# Patient Record
Sex: FEMALE | Race: WHITE | NOT HISPANIC OR LATINO | Employment: UNEMPLOYED | URBAN - METROPOLITAN AREA
[De-identification: names, ages, dates, MRNs, and addresses within clinical notes are randomized per-mention and may not be internally consistent; named-entity substitution may affect disease eponyms.]

---
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
## 2025-01-13 NOTE — TELEPHONE ENCOUNTER
REASON FOR VISIT: i have a diagnosed hip impingement and dysplasia would like to talk about my treatment options after a surgery i had in 2021    DATE OF APPT: 1/21/2025   NOTES (FOR ALL VISITS) STATUS DETAILS   OFFICE NOTE from referring provider N/A Self   OFFICE NOTE from other specialist [facility]  Arianna Agosto, PT  5/11/2021 - 6/23/2021   OPERATIVE REPORT Internal [facility]  Rios Schultz MD   Right hip arthroscopy, labral repair debridement 3/29/2021   EMG N/A    MEDICATION LIST N/A    IMAGING  (FOR ALL VISITS)     XR Internal [facility]  XR Pelvis/hip bilateral 1/05/2021   MRI (HEAD, NECK, SPINE) [facility]  MR Hip arthrogram right 2/09/2021   CT (HEAD, NECK, SPINE) N/A

## 2025-01-21 ENCOUNTER — OFFICE VISIT (OUTPATIENT)
Dept: ORTHOPEDICS | Facility: CLINIC | Age: 20
End: 2025-01-21
Payer: COMMERCIAL

## 2025-01-21 ENCOUNTER — PRE VISIT (OUTPATIENT)
Dept: ORTHOPEDICS | Facility: CLINIC | Age: 20
End: 2025-01-21

## 2025-01-21 ENCOUNTER — ANCILLARY PROCEDURE (OUTPATIENT)
Dept: GENERAL RADIOLOGY | Facility: CLINIC | Age: 20
End: 2025-01-21
Attending: FAMILY MEDICINE
Payer: COMMERCIAL

## 2025-01-21 DIAGNOSIS — G89.29 CHRONIC HIP PAIN, BILATERAL: Primary | ICD-10-CM

## 2025-01-21 DIAGNOSIS — M25.552 CHRONIC HIP PAIN, BILATERAL: Primary | ICD-10-CM

## 2025-01-21 DIAGNOSIS — M25.551 RIGHT HIP PAIN: ICD-10-CM

## 2025-01-21 DIAGNOSIS — M25.551 RIGHT HIP PAIN: Primary | ICD-10-CM

## 2025-01-21 DIAGNOSIS — M25.551 CHRONIC HIP PAIN, BILATERAL: Primary | ICD-10-CM

## 2025-01-21 PROCEDURE — 73522 X-RAY EXAM HIPS BI 3-4 VIEWS: CPT | Performed by: RADIOLOGY

## 2025-01-21 PROCEDURE — 99204 OFFICE O/P NEW MOD 45 MIN: CPT | Performed by: FAMILY MEDICINE

## 2025-01-21 ASSESSMENT — PAIN SCALES - GENERAL: PAINLEVEL_OUTOF10: MODERATE PAIN (6)

## 2025-01-21 NOTE — PROGRESS NOTES
HISTORY OF PRESENT ILLNESS  Ms. Barros is a 19 year old female who presents to clinic today with hip pain.  Namrata has a history of developmental hip dysplasia along with a hypertrophic labrum in her right hip, she underwent a arthroscopic labrum repair in 2021 with Dr. Schultz.  This surgery unfortunately did not help whatsoever with her pain.  She continues to feel significant pain, she points to the anterior portion of her hip at the groin.  Right is worse than left.        Additional history: as documented    MEDICAL HISTORY  Patient Active Problem List   Diagnosis    Right hip pain       Current Outpatient Medications   Medication Sig Dispense Refill    acetaminophen (TYLENOL) 325 MG tablet Take 2 tablets (650 mg) by mouth every 4 hours (Patient not taking: Reported on 4/14/2021) 80 tablet 0    cetirizine (ZYRTEC) 5 MG/5ML syrup Take  by mouth daily. 3 ml daily (Patient not taking: Reported on 7/25/2019) 118 mL 1    diclofenac (VOLTAREN) 75 MG EC tablet Take 1 tablet (75 mg) by mouth 2 times daily as needed for moderate pain (Patient not taking: Reported on 5/11/2021) 30 tablet 1    oxyCODONE (ROXICODONE) 5 MG tablet Take 1-2 tablets (5-10 mg) by mouth every 4 hours as needed for moderate to severe pain (Patient not taking: Reported on 4/14/2021) 12 tablet 0    Sulfacetamide Sodium-Sulfur (SULFACLEANSE 8/4) 8-4 % SUSP 1 application to affected area      tiZANidine (ZANAFLEX) 2 MG tablet Take 1 tablet (2 mg) by mouth nightly as needed for muscle spasms (Patient not taking: Reported on 5/11/2021) 30 tablet 1    tretinoin (RETIN-A) 0.1 % external cream 1 application to affected area in the evening to face         No Known Allergies    No family history on file.    Additional medical/Social/Surgical histories reviewed in EPIC and updated as appropriate.        PHYSICAL EXAM  General  - normal appearance, in no obvious distress  Musculoskeletal - right and left hip  - stance: normal gait without limp, no  obvious leg length discrepancy   - inspection: no swelling or effusion, normal bone and joint alignment, no obvious deformity  - palpation: no lateral or anterior hip tenderness  - ROM: pain with flexion and internal rotation, normal extension, external rotation  - strength: 5/5 in all planes  - special tests:  (-) YASMIN  (+) FADIR  Neuro  - no sensory or motor deficit, grossly normal coordination, normal muscle tone               ASSESSMENT & PLAN  Ms. Barros is a 19 year old female who presents to clinic today with bilateral hip pain.    I ordered and independently reviewed an xray of her right and left hip, these show no obvious acute or chronic issues.    Given the chronicity and her level of ongoing pain I am ordering MRI imaging, this will be an arthrogram for her right hip and her left hip.  I will get in touch with her with the results.    It was a pleasure seeing Namrata today.    Christopher Hernandez DO, [facility]  Primary Care Sports Medicine      This note was constructed using Dragon dictation software, please excuse any minor errors in spelling, grammar, or syntax.

## 2025-01-21 NOTE — NURSING NOTE
Chief Complaint   Patient presents with    Right Hip - Pain, New Patient     Right hip pain       There were no vitals filed for this visit.    There is no height or weight on file to calculate BMI.      ELLE Mcnulty NREMT

## 2025-01-21 NOTE — LETTER
1/21/2025      Namrata Barros  [address]  [facility] [address]      Dear Colleague,    Thank you for referring your patient, Namrata Barros, to the [facility] [place]. Please see a copy of my visit note below.        HISTORY OF PRESENT ILLNESS  Ms. Barros is a 19 year old female who presents to clinic today with hip pain.  Namrata has a history of developmental hip dysplasia along with a hypertrophic labrum in her right hip, she underwent a arthroscopic labrum repair in 2021 with Dr. Schultz.  This surgery unfortunately did not help whatsoever with her pain.  She continues to feel significant pain, she points to the anterior portion of her hip at the groin.  Right is worse than left.        Additional history: as documented    MEDICAL HISTORY  Patient Active Problem List   Diagnosis     Right hip pain       Current Outpatient Medications   Medication Sig Dispense Refill     acetaminophen (TYLENOL) 325 MG tablet Take 2 tablets (650 mg) by mouth every 4 hours (Patient not taking: Reported on 4/14/2021) 80 tablet 0     cetirizine (ZYRTEC) 5 MG/5ML syrup Take  by mouth daily. 3 ml daily (Patient not taking: Reported on 7/25/2019) 118 mL 1     diclofenac (VOLTAREN) 75 MG EC tablet Take 1 tablet (75 mg) by mouth 2 times daily as needed for moderate pain (Patient not taking: Reported on 5/11/2021) 30 tablet 1     oxyCODONE (ROXICODONE) 5 MG tablet Take 1-2 tablets (5-10 mg) by mouth every 4 hours as needed for moderate to severe pain (Patient not taking: Reported on 4/14/2021) 12 tablet 0     Sulfacetamide Sodium-Sulfur (SULFACLEANSE 8/4) 8-4 % SUSP 1 application to affected area       tiZANidine (ZANAFLEX) 2 MG tablet Take 1 tablet (2 mg) by mouth nightly as needed for muscle spasms (Patient not taking: Reported on 5/11/2021) 30 tablet 1     tretinoin (RETIN-A) 0.1 % external cream 1 application to affected area in the evening to face         No Known Allergies    No  family history on file.    Additional medical/Social/Surgical histories reviewed in EPIC and updated as appropriate.        PHYSICAL EXAM  General  - normal appearance, in no obvious distress  Musculoskeletal - right and left hip  - stance: normal gait without limp, no obvious leg length discrepancy   - inspection: no swelling or effusion, normal bone and joint alignment, no obvious deformity  - palpation: no lateral or anterior hip tenderness  - ROM: pain with flexion and internal rotation, normal extension, external rotation  - strength: 5/5 in all planes  - special tests:  (-) YASMIN  (+) FADIR  Neuro  - no sensory or motor deficit, grossly normal coordination, normal muscle tone               ASSESSMENT & PLAN  Ms. Barros is a 19 year old female who presents to clinic today with bilateral hip pain.    I ordered and independently reviewed an xray of her right and left hip, these show no obvious acute or chronic issues.    Given the chronicity and her level of ongoing pain I am ordering MRI imaging, this will be an arthrogram for her right hip and her left hip.  I will get in touch with her with the results.    It was a pleasure seeing Namrata today.    Christopher Hernandez DO, [facility]  Primary Care [facility] Medicine      This note was constructed using Dragon dictation software, please excuse any minor errors in spelling, grammar, or syntax.      Again, thank you for allowing me to participate in the care of your patient.        Sincerely,    Christopher Hernandez DO    Electronically signed

## 2025-02-05 ENCOUNTER — TRANSFERRED RECORDS (OUTPATIENT)
Dept: HEALTH INFORMATION MANAGEMENT | Facility: CLINIC | Age: 20
End: 2025-02-05
Payer: COMMERCIAL

## 2025-02-08 ENCOUNTER — HEALTH MAINTENANCE LETTER (OUTPATIENT)
Age: 20
End: 2025-02-08

## 2025-02-24 ENCOUNTER — OFFICE VISIT (OUTPATIENT)
Dept: ORTHOPEDICS | Facility: CLINIC | Age: 20
End: 2025-02-24
Payer: COMMERCIAL

## 2025-02-24 DIAGNOSIS — S73.191D TEAR OF RIGHT ACETABULAR LABRUM, SUBSEQUENT ENCOUNTER: Primary | ICD-10-CM

## 2025-02-24 PROCEDURE — 99214 OFFICE O/P EST MOD 30 MIN: CPT | Performed by: FAMILY MEDICINE

## 2025-02-24 ASSESSMENT — PAIN SCALES - GENERAL: PAINLEVEL_OUTOF10: SEVERE PAIN (7)

## 2025-02-24 NOTE — PROGRESS NOTES
HISTORY OF PRESENT ILLNESS  Ms. Barros is a pleasant 19 year old female following up with bilateral hip pain.  Namrata is here to review her MRI.  Unfortunately she continues to experience bilateral hip pain, no significant change in her pain.  She may feel worse on certain days.     PHYSICAL EXAM  General  - normal appearance, in no obvious distress  Musculoskeletal - right and left hip  - stance: normal gait without limp, no obvious leg length discrepancy   - inspection: no swelling or effusion, normal bone and joint alignment, no obvious deformity  - palpation: no lateral or anterior hip tenderness  - ROM: pain with flexion and internal rotation, normal extension, external rotation  - strength: 5/5 in all planes  - special tests:  (-) YASMIN  (+) FADIR  Neuro  - no sensory or motor deficit, grossly normal coordination, normal muscle tone    ASSESSMENT & PLAN  Ms. Barros is a 19 year old female following up with bilateral hip pain.    I reviewed her MR results in the room with her, this reads:  1.  Surgical changes status post right hip anterior superior labral repair.  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  No right hip chondral loss or subchondral cystic change/subchondral edema-like signal.  2.  No fracture, osseous stress injury, or tendinous pathology of the right hip.    We discussed these results and their implications.  It does seem that her labrum tear is the source of her symptoms.  We discussed the utility of a diagnostic and therapeutic corticosteroid injection, we also discussed the role of further physical therapy and a surgical consult.  Namrata is significantly affected by her symptoms, she is interested in discussing this with a surgeon.  I am referring her to our partners to have this discussion.    It was a pleasure seeing Namrata.        Christopher Hernandez DO, [facility]      This note was constructed using  Dragon dictation software, please excuse any minor errors in spelling, grammar, or syntax.

## 2025-02-24 NOTE — LETTER
2/24/2025      Namrata Barros  [address] [place]  [facility] [address]      Dear Colleague,    Thank you for referring your patient, Namrata Barros, to the [facility] [place]. Please see a copy of my visit note below.    HISTORY OF PRESENT ILLNESS  Ms. Barros is a pleasant 19 year old female following up with bilateral hip pain.  Namrata is here to review her MRI.  Unfortunately she continues to experience bilateral hip pain, no significant change in her pain.  She may feel worse on certain days.     PHYSICAL EXAM  General  - normal appearance, in no obvious distress  Musculoskeletal - right and left hip  - stance: normal gait without limp, no obvious leg length discrepancy   - inspection: no swelling or effusion, normal bone and joint alignment, no obvious deformity  - palpation: no lateral or anterior hip tenderness  - ROM: pain with flexion and internal rotation, normal extension, external rotation  - strength: 5/5 in all planes  - special tests:  (-) YASMIN  (+) FADIR  Neuro  - no sensory or motor deficit, grossly normal coordination, normal muscle tone    ASSESSMENT & PLAN  Ms. Barros is a 19 year old female following up with bilateral hip pain.    I reviewed her MR results in the room with her, this reads:  1.  Surgical changes status post right hip anterior superior labral repair.  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  No right hip chondral loss or subchondral cystic change/subchondral edema-like signal.  2.  No fracture, osseous stress injury, or tendinous pathology of the right hip.    We discussed these results and their implications.  It does seem that her labrum tear is the source of her symptoms.  We discussed the utility of a diagnostic and therapeutic corticosteroid injection, we also discussed the role of further physical therapy and a surgical  consult.  Namrata is significantly affected by her symptoms, she is interested in discussing this with a surgeon.  I am referring her to our partners to have this discussion.    It was a pleasure seeing Namrata.        Christopher Hernandez DO, CAQSM      This note was constructed using Dragon dictation software, please excuse any minor errors in spelling, grammar, or syntax.        Again, thank you for allowing me to participate in the care of your patient.        Sincerely,    Christopher Hernandez DO    Electronically signed

## 2025-02-24 NOTE — NURSING NOTE
Chief Complaint   Patient presents with    Right Hip - Pain, Follow Up     MRI follow up       There were no vitals filed for this visit.    There is no height or weight on file to calculate BMI.      ELLE Mcnulty NREMT

## 2025-05-14 SDOH — HEALTH STABILITY: PHYSICAL HEALTH: ON AVERAGE, HOW MANY MINUTES DO YOU ENGAGE IN EXERCISE AT THIS LEVEL?: 40 MIN

## 2025-05-14 SDOH — HEALTH STABILITY: PHYSICAL HEALTH: ON AVERAGE, HOW MANY DAYS PER WEEK DO YOU ENGAGE IN MODERATE TO STRENUOUS EXERCISE (LIKE A BRISK WALK)?: 1 DAY

## 2025-05-15 ENCOUNTER — OFFICE VISIT (OUTPATIENT)
Dept: ORTHOPEDICS | Facility: CLINIC | Age: 20
End: 2025-05-15
Payer: COMMERCIAL

## 2025-05-15 DIAGNOSIS — M25.852 FEMOROACETABULAR IMPINGEMENT OF BOTH HIPS: Primary | ICD-10-CM

## 2025-05-15 DIAGNOSIS — M25.851 FEMOROACETABULAR IMPINGEMENT OF BOTH HIPS: Primary | ICD-10-CM

## 2025-05-15 RX ORDER — CLONAZEPAM 0.5 MG/1
0.5 TABLET ORAL 2 TIMES DAILY
COMMUNITY

## 2025-05-15 RX ORDER — TRIAMCINOLONE ACETONIDE 40 MG/ML
40 INJECTION, SUSPENSION INTRA-ARTICULAR; INTRAMUSCULAR
Status: COMPLETED | OUTPATIENT
Start: 2025-05-15 | End: 2025-05-15

## 2025-05-15 RX ORDER — LIDOCAINE HYDROCHLORIDE 10 MG/ML
4 INJECTION, SOLUTION INFILTRATION; PERINEURAL
Status: COMPLETED | OUTPATIENT
Start: 2025-05-15 | End: 2025-05-15

## 2025-05-15 RX ORDER — GUANFACINE 3 MG/1
1 TABLET, EXTENDED RELEASE ORAL AT BEDTIME
COMMUNITY

## 2025-05-15 RX ORDER — AMOXICILLIN 250 MG
1 CAPSULE ORAL 2 TIMES DAILY
COMMUNITY
Start: 2025-03-31

## 2025-05-15 RX ORDER — QUETIAPINE FUMARATE 25 MG/1
25 TABLET, FILM COATED ORAL AT BEDTIME
COMMUNITY
Start: 2025-02-21

## 2025-05-15 RX ADMIN — LIDOCAINE HYDROCHLORIDE 4 ML: 10 INJECTION, SOLUTION INFILTRATION; PERINEURAL at 10:34

## 2025-05-15 RX ADMIN — TRIAMCINOLONE ACETONIDE 40 MG: 40 INJECTION, SUSPENSION INTRA-ARTICULAR; INTRAMUSCULAR at 10:34

## 2025-05-15 ASSESSMENT — PAIN SCALES - GENERAL: PAINLEVEL_OUTOF10: MODERATE PAIN (6)

## 2025-05-15 NOTE — NURSING NOTE
[facility], [facility] MEDICINE and PODIATRY  [address] [place] [address]  Dept: [phone]  ______________________________________________________________________________    Patient: Namrata Barros   : 2005   MRN: [id number]   May 15, 2025    INVASIVE PROCEDURE SAFETY CHECKLIST    Date: 5/15/2025   Procedure: Bilateral hip injection  Patient Name: Namrata Barros  MRN: [id number]  YOB: 2005    Action: Complete sections as appropriate. Any discrepancy results in a HARD COPY until resolved.     PRE PROCEDURE:  Patient ID verified with 2 identifiers (name and  or MRN): Yes  Procedure and site verified with patient/designee (when able): Yes  Accurate consent documentation in medical record: Yes  H&P (or appropriate assessment) documented in medical record: Yes  H&P must be up to 20 days prior to procedure and updates within 24 hours of procedure as applicable: NA  Relevant diagnostic and radiology test results appropriately labeled and displayed as applicable: NA  Procedure site(s) marked with provider initials: NA    TIMEOUT:  Time-Out performed immediately prior to starting procedure, including verbal and active participation of all team members addressing the following:Yes  * Correct patient identify  * Confirmed that the correct side and site are marked  * An accurate procedure consent form  * Agreement on the procedure to be done  * Correct patient position  * Relevant images and results are properly labeled and appropriately displayed  * The need to administer antibiotics or fluids for irrigation purposes during the procedure as applicable   * Safety precautions based on patient history or medication use    DURING PROCEDURE: Verification of correct person, site, and procedures any time the responsibility for care of the patient is transferred to another member of the care team.       Prior to injection, verified patient identity using patient's name  and date of birth.  Due to injection administration, patient instructed to remain in clinic for 15 minutes  afterwards, and to report any adverse reaction to me immediately.    Joint injection was performed.      Drug Amount Wasted:  None.  Vial/Syringe: Single dose vial  Expiration Date:  11/30/2026      Hamlet Wise, EMT  May 15, 2025

## 2025-05-15 NOTE — PROGRESS NOTES
HISTORY OF PRESENT ILLNESS  Ms. Barros is a pleasant 19 year old female following up with right and left hip pain.  Namrata continues to experience fairly severe bilateral hip pain, she was recently seen by Dr. Louis who had discussed with her potential repeat surgery.  She is interested in this, although given her pain she is also interested in injection based therapy today.     PHYSICAL EXAM  General  - normal appearance, in no obvious distress  Musculoskeletal - right and left hip  - stance: normal gait without limp, no obvious leg length discrepancy   - inspection: no swelling or effusion, normal bone and joint alignment, no obvious deformity  - palpation: no lateral or anterior hip tenderness  - ROM: pain with flexion and internal rotation  - strength: 5/5 in all planes  - special tests:  (+) FADIR  Neuro  - no sensory or motor deficit, grossly normal coordination, normal muscle tone        ASSESSMENT & PLAN  Ms. Barros is a 19 year old female following up with bilateral hip impingement.    Through shared decision making we did decide to move forward with bilateral hip injections today, which she tolerated well.    She is contemplating surgery in the winter, we did discuss that if her symptomatic relief is short-lived we could consider repeating her injections, as long as she does not have a corticosteroid injection within 90 days of her potential surgery.  She is going to get in touch with us if this is the case.    It was a pleasure seeing Namrata.    Greater than 20 minutes were spent on the day of visit reviewing records, in direct face-to-face consultation and exam, and documentation independent of the procedure.         Christopher Hernandez DO, JEREMIAH      This note was constructed using Dragon dictation software, please excuse any minor errors in spelling, grammar, or syntax.      Large Joint Injection/Arthocentesis: bilateral hip joint    Date/Time: 5/15/2025 10:34 AM    Performed by: Christopher Hernandez,  DO  Authorized by: Christopher Hernandez DO    Indications:  Pain  Needle Size:  22 G  Guidance: ultrasound    Location:  Hip  Laterality:  Bilateral      Site:  Bilateral hip joint  Medications (Right):  40 mg triamcinolone 40 MG/ML; 4 mL lidocaine 1 %  Medications (Left):  40 mg triamcinolone 40 MG/ML; 4 mL lidocaine 1 %  Outcome:  Tolerated well, no immediate complications  Procedure discussed: discussed risks, benefits, and alternatives    Consent Given by:  Patient  Timeout: timeout called immediately prior to procedure    Prep: patient was prepped and draped in usual sterile fashion       Intraarticular Hip Injections - Ultrasound Guided    The patient was informed of the risks and the benefits of the procedure and a written consent was signed.    The patient's right hip was prepped with chlorhexidine in sterile fashion.   40 mg of triamcinolone suspension was drawn up into a 5 mL syringe with 4 mL of 1% lidocaine.  Injection was performed using sterile technique.  Under ultrasound guidance a 3.5-inch 22-gauge needle was used to enter the femoracetabular joint.  Anterior approach was used, needle placement was visualized and documented with ultrasound.  Ultrasound visualization was necessary due to decreased joint space in the setting of osteoarthritis.  Injection performed long axis to the probe.  Injection solution visualized within the joint space.  Images were permanently stored for the patient's record.  There were no complications. The patient tolerated the procedure well. There was negligible bleeding.     The patient's left hip was prepped with chlorhexidine in sterile fashion.   40 mg of triamcinolone suspension was drawn up into a 5 mL syringe with 4 mL of 1% lidocaine.  Injection was performed using sterile technique.  Under ultrasound guidance a 3.5-inch 22-gauge needle was used to enter the femoracetabular joint.  Anterior approach was used, needle placement was visualized and documented with  ultrasound.  Ultrasound visualization was necessary due to decreased joint space in the setting of osteoarthritis.  Injection performed long axis to the probe.  Injection solution visualized within the joint space.  Images were permanently stored for the patient's record.  There were no complications. The patient tolerated the procedure well. There was negligible bleeding.

## 2025-05-15 NOTE — NURSING NOTE
Chief Complaint   Patient presents with    Left Hip - Pain, Follow Up     Discuss injection    Right Hip - Follow Up, Pain     Discuss injection         There were no vitals filed for this visit.    There is no height or weight on file to calculate BMI.      ELLE Mcnulty NREMT

## 2025-05-15 NOTE — LETTER
5/15/2025      Namrata Barros  [address] [place]  [facility] [address]      Dear Colleague,    Thank you for referring your patient, Namrata Barros, to the [facility] [place]. Please see a copy of my visit note below.    HISTORY OF PRESENT ILLNESS  Ms. Barros is a pleasant 19 year old female following up with right and left hip pain.  Namrata continues to experience fairly severe bilateral hip pain, she was recently seen by Dr. Louis who had discussed with her potential repeat surgery.  She is interested in this, although given her pain she is also interested in injection based therapy today.     PHYSICAL EXAM  General  - normal appearance, in no obvious distress  Musculoskeletal - right and left hip  - stance: normal gait without limp, no obvious leg length discrepancy   - inspection: no swelling or effusion, normal bone and joint alignment, no obvious deformity  - palpation: no lateral or anterior hip tenderness  - ROM: pain with flexion and internal rotation  - strength: 5/5 in all planes  - special tests:  (+) FADIR  Neuro  - no sensory or motor deficit, grossly normal coordination, normal muscle tone        ASSESSMENT & PLAN  Ms. Barros is a 19 year old female following up with bilateral hip impingement.    Through shared decision making we did decide to move forward with bilateral hip injections today, which she tolerated well.    She is contemplating surgery in the winter, we did discuss that if her symptomatic relief is short-lived we could consider repeating her injections, as long as she does not have a corticosteroid injection within 90 days of her potential surgery.  She is going to get in touch with us if this is the case.    It was a pleasure seeing Namrata.    Greater than 20 minutes were spent on the day of visit reviewing records, in direct face-to-face consultation and exam, and documentation independent of the procedure.         Christopher Hernandez DO,  CAQSM      This note was constructed using Dragon dictation software, please excuse any minor errors in spelling, grammar, or syntax.      Large Joint Injection/Arthocentesis: bilateral hip joint    Date/Time: 5/15/2025 10:34 AM    Performed by: Christopher Hernandez DO  Authorized by: Christopher Hernandez DO    Indications:  Pain  Needle Size:  22 G  Guidance: ultrasound    Location:  Hip  Laterality:  Bilateral      Site:  Bilateral hip joint  Medications (Right):  40 mg triamcinolone 40 MG/ML; 4 mL lidocaine 1 %  Medications (Left):  40 mg triamcinolone 40 MG/ML; 4 mL lidocaine 1 %  Outcome:  Tolerated well, no immediate complications  Procedure discussed: discussed risks, benefits, and alternatives    Consent Given by:  Patient  Timeout: timeout called immediately prior to procedure    Prep: patient was prepped and draped in usual sterile fashion       Intraarticular Hip Injections - Ultrasound Guided    The patient was informed of the risks and the benefits of the procedure and a written consent was signed.    The patient's right hip was prepped with chlorhexidine in sterile fashion.   40 mg of triamcinolone suspension was drawn up into a 5 mL syringe with 4 mL of 1% lidocaine.  Injection was performed using sterile technique.  Under ultrasound guidance a 3.5-inch 22-gauge needle was used to enter the femoracetabular joint.  Anterior approach was used, needle placement was visualized and documented with ultrasound.  Ultrasound visualization was necessary due to decreased joint space in the setting of osteoarthritis.  Injection performed long axis to the probe.  Injection solution visualized within the joint space.  Images were permanently stored for the patient's record.  There were no complications. The patient tolerated the procedure well. There was negligible bleeding.     The patient's left hip was prepped with chlorhexidine in sterile fashion.   40 mg of triamcinolone suspension was drawn up into a 5 mL syringe  with 4 mL of 1% lidocaine.  Injection was performed using sterile technique.  Under ultrasound guidance a 3.5-inch 22-gauge needle was used to enter the femoracetabular joint.  Anterior approach was used, needle placement was visualized and documented with ultrasound.  Ultrasound visualization was necessary due to decreased joint space in the setting of osteoarthritis.  Injection performed long axis to the probe.  Injection solution visualized within the joint space.  Images were permanently stored for the patient's record.  There were no complications. The patient tolerated the procedure well. There was negligible bleeding.                       Again, thank you for allowing me to participate in the care of your patient.        Sincerely,        Christopher Hernandez DO    Electronically signed

## 2025-07-11 ENCOUNTER — APPOINTMENT (OUTPATIENT)
Dept: URBAN - METROPOLITAN AREA CLINIC 259 | Age: 20
Setting detail: DERMATOLOGY
End: 2025-07-14

## 2025-07-11 VITALS — HEIGHT: 62 IN | WEIGHT: 115 LBS

## 2025-07-11 DIAGNOSIS — L70.0 ACNE VULGARIS: ICD-10-CM

## 2025-07-11 PROCEDURE — OTHER COUNSELING: OTHER

## 2025-07-11 PROCEDURE — OTHER PRESCRIPTION: OTHER

## 2025-07-11 PROCEDURE — OTHER PRESCRIPTION MEDICATION MANAGEMENT: OTHER

## 2025-07-11 PROCEDURE — 99214 OFFICE O/P EST MOD 30 MIN: CPT

## 2025-07-11 RX ORDER — ADAPALENE 3 MG/G
0.3% GEL TOPICAL QPM
Qty: 45 | Refills: 3 | Status: ERX | COMMUNITY
Start: 2025-07-11

## 2025-08-11 ENCOUNTER — OFFICE VISIT (OUTPATIENT)
Dept: ORTHOPEDICS | Facility: CLINIC | Age: 20
End: 2025-08-11
Payer: COMMERCIAL

## 2025-08-11 DIAGNOSIS — M25.851 FEMOROACETABULAR IMPINGEMENT OF BOTH HIPS: Primary | ICD-10-CM

## 2025-08-11 DIAGNOSIS — M25.852 FEMOROACETABULAR IMPINGEMENT OF BOTH HIPS: Primary | ICD-10-CM

## 2025-08-11 PROCEDURE — 1125F AMNT PAIN NOTED PAIN PRSNT: CPT | Performed by: FAMILY MEDICINE

## 2025-08-11 PROCEDURE — 99213 OFFICE O/P EST LOW 20 MIN: CPT | Performed by: FAMILY MEDICINE

## 2025-08-11 ASSESSMENT — PAIN SCALES - GENERAL: PAINLEVEL_OUTOF10: MODERATE PAIN (4)

## (undated) DEVICE — Device

## (undated) DEVICE — BLADE ARTHRO BEAVER BANANA 376984

## (undated) DEVICE — PACK HIP CUSTOM ASC

## (undated) DEVICE — SU VICRYL 2-0 CT-2 27" UND J269H

## (undated) DEVICE — BLANKET BAIR HUGGER UPPER BODY 42268

## (undated) DEVICE — GLOVE PROTEXIS POWDER FREE 8.0 ORTHOPEDIC 2D73ET80

## (undated) DEVICE — ESU GROUND PAD ADULT W/CORD E7507

## (undated) DEVICE — BUR ARTHREX CURVED DISSECTOR 4.2MMX19CM AR-6420CDS

## (undated) DEVICE — PREP DURAPREP 26ML APL 8630

## (undated) DEVICE — SU ETHILON 3-0 PS-1 18" 1663H

## (undated) DEVICE — SOL WATER IRRIG 500ML BOTTLE 2F7113

## (undated) DEVICE — TUBING SYSTEM ARTHREX PATIENT REDEUCE AR-6421

## (undated) DEVICE — STRAP KNEE/BODY 31143004

## (undated) DEVICE — LINEN ORTHO PACK 5446

## (undated) DEVICE — PAD FOAM POST PERINEAL FX TABLE NO-001

## (undated) DEVICE — DRAPE IOBAN ISOLATION VERTICAL 320X21CM 6617

## (undated) DEVICE — GLOVE PROTEXIS POWDER FREE SMT 7.5  2D72PT75X

## (undated) DEVICE — SU FIBERWIRE #2 FIBERSTICK 50"  AR-7209

## (undated) DEVICE — SUCTION MANIFOLD NEPTUNE 2 SYS 4 PORT 0702-020-000

## (undated) DEVICE — PAD ARMBOARD FOAM EGGCRATE COVIDEN 3114367

## (undated) DEVICE — GUIDEWIRE NITINOL 1.2MMX45CM 72202998

## (undated) DEVICE — ESU VULCAN PROBE EFLEX ABLATOR 72200683

## (undated) DEVICE — DRAPE MAYO STAND 23X54 8337

## (undated) DEVICE — TUBING SYSTEM ARTHREX PUMP REDEUCE AR-6411

## (undated) RX ORDER — LIDOCAINE HYDROCHLORIDE 20 MG/ML
INJECTION, SOLUTION EPIDURAL; INFILTRATION; INTRACAUDAL; PERINEURAL
Status: DISPENSED
Start: 2021-03-29

## (undated) RX ORDER — EPINEPHRINE NASAL SOLUTION 1 MG/ML
SOLUTION NASAL
Status: DISPENSED
Start: 2021-03-29

## (undated) RX ORDER — PROPOFOL 10 MG/ML
INJECTION, EMULSION INTRAVENOUS
Status: DISPENSED
Start: 2021-03-29

## (undated) RX ORDER — OXYCODONE HYDROCHLORIDE 5 MG/1
TABLET ORAL
Status: DISPENSED
Start: 2021-03-29

## (undated) RX ORDER — ONDANSETRON 2 MG/ML
INJECTION INTRAMUSCULAR; INTRAVENOUS
Status: DISPENSED
Start: 2021-03-29

## (undated) RX ORDER — FENTANYL CITRATE 50 UG/ML
INJECTION, SOLUTION INTRAMUSCULAR; INTRAVENOUS
Status: DISPENSED
Start: 2021-03-29

## (undated) RX ORDER — DEXAMETHASONE SODIUM PHOSPHATE 4 MG/ML
INJECTION, SOLUTION INTRA-ARTICULAR; INTRALESIONAL; INTRAMUSCULAR; INTRAVENOUS; SOFT TISSUE
Status: DISPENSED
Start: 2021-03-29

## (undated) RX ORDER — HYDROMORPHONE HYDROCHLORIDE 1 MG/ML
INJECTION, SOLUTION INTRAMUSCULAR; INTRAVENOUS; SUBCUTANEOUS
Status: DISPENSED
Start: 2021-03-29

## (undated) RX ORDER — KETOROLAC TROMETHAMINE 30 MG/ML
INJECTION, SOLUTION INTRAMUSCULAR; INTRAVENOUS
Status: DISPENSED
Start: 2021-03-29

## (undated) RX ORDER — MEPERIDINE HYDROCHLORIDE 25 MG/ML
INJECTION INTRAMUSCULAR; INTRAVENOUS; SUBCUTANEOUS
Status: DISPENSED
Start: 2021-03-29

## (undated) RX ORDER — GABAPENTIN 300 MG/1
CAPSULE ORAL
Status: DISPENSED
Start: 2021-03-29

## (undated) RX ORDER — CEFAZOLIN SODIUM 1 G/3ML
INJECTION, POWDER, FOR SOLUTION INTRAMUSCULAR; INTRAVENOUS
Status: DISPENSED
Start: 2021-03-29

## (undated) RX ORDER — LIDOCAINE HYDROCHLORIDE 10 MG/ML
INJECTION, SOLUTION EPIDURAL; INFILTRATION; INTRACAUDAL; PERINEURAL
Status: DISPENSED
Start: 2021-02-09

## (undated) RX ORDER — ACETAMINOPHEN 325 MG/1
TABLET ORAL
Status: DISPENSED
Start: 2021-03-29